# Patient Record
Sex: MALE | Race: OTHER | HISPANIC OR LATINO | Employment: STUDENT | URBAN - METROPOLITAN AREA
[De-identification: names, ages, dates, MRNs, and addresses within clinical notes are randomized per-mention and may not be internally consistent; named-entity substitution may affect disease eponyms.]

---
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
## 2024-03-19 ENCOUNTER — HOSPITAL ENCOUNTER (EMERGENCY)
Facility: HOSPITAL | Age: 24
Discharge: HOME | End: 2024-03-19
Attending: EMERGENCY MEDICINE
Payer: COMMERCIAL

## 2024-03-19 VITALS
SYSTOLIC BLOOD PRESSURE: 130 MMHG | DIASTOLIC BLOOD PRESSURE: 81 MMHG | TEMPERATURE: 98.7 F | OXYGEN SATURATION: 97 % | HEART RATE: 69 BPM | RESPIRATION RATE: 19 BRPM

## 2024-03-19 DIAGNOSIS — H91.92 DECREASED HEARING OF LEFT EAR: Primary | ICD-10-CM

## 2024-03-19 PROCEDURE — 99282 EMERGENCY DEPT VISIT SF MDM: CPT

## 2024-03-19 PROCEDURE — 99284 EMERGENCY DEPT VISIT MOD MDM: CPT

## 2024-03-19 RX ORDER — OFLOXACIN 3 MG/ML
5 SOLUTION AURICULAR (OTIC) 2 TIMES DAILY
Qty: 5 ML | Refills: 0 | Status: SHIPPED | OUTPATIENT
Start: 2024-03-19

## 2024-03-19 NOTE — DISCHARGE INSTRUCTIONS
Follow-up with ENT.  Take medicine as directed.  If any symptoms worsen or new symptoms appear return to the ER.

## 2024-03-19 NOTE — ED PROVIDER NOTES
"History  Chief Complaint   Patient presents with    Earache     Patient reports 3 weeks ago they went too far with a q tip, and experienced left ear pain. Seen at [facility] 3 weeks ago for same, provided ofloxacin drops for same, reports improvement to left ear pain however, patient reports tonight patient reports laying on left side, sensation of \"air going into my ear\" with pressure causing decreased hearing and pain to left ear.      The patient is a 24 y.o. male with a history of testicle surgery who presents to [facility] with a chief complaint of left ear hearing decrease. Symptoms began 3 weeks ago and have been intermittent since onset.  Patient reports using a Q-tip around 3 weeks ago pushing wax into his ear and had decreased hearing at that time.  Patient states that he was seen at [facility] and his ear was flushed of cerumen impaction. He reports he was given ofloxacin drops and his hearing improved. He states that tonight he noticed his decreased hearing returned. He currently denies fever or any pain. Associated symptoms include decreased hearing in the left ear. Symptoms are aggravated with none noted and alleviating factors include none noted. The patient denies fever, chills, night sweats, chest pain, shortness of breath, ear pain, ear discharge, headache, visual disturbance, falls, trauma, any q-tip use past initial incident. No other reported symptoms at this time.  Patient denies allergies to anything          History provided by:  Patient   used: No    Earache  Associated symptoms: hearing loss    Associated symptoms: no abdominal pain, no cough, no fever, no rash, no sore throat and no vomiting        Prior to Admission Medications   Prescriptions Last Dose Informant Patient Reported? Taking?   ondansetron (ZOFRAN-ODT) 4 mg disintegrating tablet   No No   Sig: Take 1 tablet by mouth every 8 (eight) hours as needed for nausea or vomiting   ondansetron " (ZOFRAN-ODT) 4 mg disintegrating tablet   No No   Sig: Take 1 tablet (4 mg total) by mouth every 8 (eight) hours as needed for nausea   oxymetazoline (AFRIN) 0.05 % nasal spray   No No   Si sprays by Each Nare route 2 (two) times a day   predniSONE 10 mg tablet   No No   Si mg for 2 days, 40 mg for 2 days, 30 mg for 2 days, 20 mg for 2 days, 10 mg for 2 days      Facility-Administered Medications: None       History reviewed. No pertinent past medical history.    Past Surgical History:   Procedure Laterality Date    TESTICLE SURGERY         History reviewed. No pertinent family history.  I have reviewed and agree with the history as documented.    E-Cigarette/Vaping    E-Cigarette Use Never User      E-Cigarette/Vaping Substances     Social History     Tobacco Use    Smoking status: Never    Smokeless tobacco: Never   Vaping Use    Vaping status: Never Used   Substance Use Topics    Alcohol use: No    Drug use: No       Review of Systems   Constitutional:  Negative for chills and fever.   HENT:  Positive for hearing loss. Negative for ear pain and sore throat.    Eyes:  Negative for pain and visual disturbance.   Respiratory:  Negative for cough and shortness of breath.    Cardiovascular:  Negative for chest pain and palpitations.   Gastrointestinal:  Negative for abdominal pain and vomiting.   Genitourinary:  Negative for dysuria and hematuria.   Musculoskeletal:  Negative for arthralgias and back pain.   Skin:  Negative for color change and rash.   Neurological:  Negative for seizures and syncope.   All other systems reviewed and are negative.      Physical Exam  Physical Exam  Vitals reviewed.   Constitutional:       General: He is not in acute distress.     Appearance: Normal appearance. He is not ill-appearing.   HENT:      Head: Normocephalic and atraumatic.      Right Ear: Tympanic membrane, ear canal and external ear normal. No decreased hearing noted. There is no impacted cerumen.      Left Ear:  Ear canal and external ear normal. Decreased hearing noted. No laceration, drainage, swelling or tenderness. There is no impacted cerumen. No foreign body. No mastoid tenderness. No PE tube. No hemotympanum. Tympanic membrane is not injected or scarred.      Nose: Nose normal.      Mouth/Throat:      Mouth: Mucous membranes are moist.      Pharynx: Oropharynx is clear. No oropharyngeal exudate.   Eyes:      General: No scleral icterus.     Extraocular Movements: Extraocular movements intact.      Conjunctiva/sclera: Conjunctivae normal.      Pupils: Pupils are equal, round, and reactive to light.   Cardiovascular:      Rate and Rhythm: Normal rate.      Pulses: Normal pulses.   Pulmonary:      Effort: Pulmonary effort is normal. No respiratory distress.      Breath sounds: No stridor. No wheezing or rhonchi.   Abdominal:      General: Abdomen is flat.      Tenderness: There is no abdominal tenderness.   Musculoskeletal:         General: No swelling. Normal range of motion.      Cervical back: Normal range of motion and neck supple. No rigidity.   Skin:     General: Skin is warm and dry.      Capillary Refill: Capillary refill takes less than 2 seconds.      Coloration: Skin is not jaundiced.   Neurological:      General: No focal deficit present.      Mental Status: He is alert and oriented to person, place, and time. Mental status is at baseline.      Cranial Nerves: No cranial nerve deficit.      Sensory: No sensory deficit.      Motor: No weakness.         Vital Signs  ED Triage Vitals [03/19/24 0249]   Temperature Pulse Respirations Blood Pressure SpO2   98.7 °F (37.1 °C) 69 19 130/81 97 %      Temp Source Heart Rate Source Patient Position - Orthostatic VS BP Location FiO2 (%)   Temporal Monitor Sitting Left arm --      Pain Score       --           Vitals:    03/19/24 0249   BP: 130/81   Pulse: 69   Patient Position - Orthostatic VS: Sitting         Visual Acuity      ED Medications  Medications - No data to  display    Diagnostic Studies  Results Reviewed       None                   No orders to display              Procedures  Procedures         ED Course                               SBIRT 22yo+      Flowsheet Row Most Recent Value   Initial Alcohol Screen: US AUDIT-C     1. How often do you have a drink containing alcohol? 0 Filed at: 03/19/2024 0409   2. How many drinks containing alcohol do you have on a typical day you are drinking?  0 Filed at: 03/19/2024 0409   Audit-C Score 0 Filed at: 03/19/2024 0409   SEGUNDO: How many times in the past year have you...    Used an illegal drug or used a prescription medication for non-medical reasons? Never Filed at: 03/19/2024 0409                      Medical Decision Making  The patient is a 24 y.o. male with a history of testicle surgery who presents to [facility] with a chief complaint of left ear hearing decrease.  Patient reports being seen within in the last three weeks for the same issue.He reports using ofloxacin drops with initial improvement, but now his hearing is decreased again. Patient can hear however states it is muffled. He reports that he has no pain, trauma, discharge, numbness, tingling, or balance issues. Recommended starting the ofloxacin drops again and seeing ENT today. Patient has drops left. He understands and agrees with plan. Prior to discharge, discharge instructions were discussed with patient at bedside. Patient was provided both verbal and written instructions. Patient is understanding of the discharge instructions and is agreeable to plan of care. Return precautions were discussed with patient bedside, patient verbalized understanding of signs and symptoms that would necessitate return to the ED. All questions were answered. Patient was comfortable with the plan of care and discharged to home.        Problems Addressed:  Decreased hearing of left ear: acute illness or injury    Risk  Prescription drug management.              Disposition  Final diagnoses:   Decreased hearing of left ear     Time reflects when diagnosis was documented in both MDM as applicable and the Disposition within this note       Time User Action Codes Description Comment    3/19/2024  3:55 AM CapriceJose bland Add [H91.92] Decreased hearing of left ear           ED Disposition       ED Disposition   Discharge    Condition   Stable    Date/Time   Tue Mar 19, 2024 8690    Comment   Rianmary Paige discharge to home/self care.                   Follow-up Information       Follow up With Specialties Details Why Contact Info Additional Information    [facility],  Family Medicine Schedule an appointment as soon as possible for a visit   [address] [facility] [place] [address]  [phone]       [facility] Emergency Medicine  If symptoms worsen [address] [facility] [id number] [phone] [facility], [address] [place], [place], [address]    [facility]ology Schedule an appointment as soon as possible for a visit today  [address] [facility] [address]  [phone] [facility] [address] [place] [address]            Discharge Medication List as of 3/19/2024  3:58 AM        START taking these medications    Details   ofloxacin (FLOXIN) 0.3 % otic solution Administer 5 drops into the left ear 2 (two) times a day, Starting Tue 3/19/2024, Normal           CONTINUE these medications which have NOT CHANGED    Details   !! ondansetron (ZOFRAN-ODT) 4 mg disintegrating tablet Take 1 tablet by mouth every 8 (eight) hours as needed for nausea or vomiting, Starting Fri 7/28/2017, Print      !! ondansetron (ZOFRAN-ODT) 4 mg disintegrating tablet Take 1 tablet (4 mg total) by mouth every 8 (eight) hours as needed for nausea, Starting Sun 11/18/2018, Print       oxymetazoline (AFRIN) 0.05 % nasal spray 2 sprays by Each Nare route 2 (two) times a day, Starting Mon 10/11/2021, Print      predniSONE 10 mg tablet 50 mg for 2 days, 40 mg for 2 days, 30 mg for 2 days, 20 mg for 2 days, 10 mg for 2 days, Normal       !! - Potential duplicate medications found. Please discuss with provider.              PDMP Review       None            ED Provider  Electronically Signed by             Jose Paige PA-C  03/19/24 9653

## 2024-05-27 VITALS
RESPIRATION RATE: 16 BRPM | BODY MASS INDEX: 29.73 KG/M2 | TEMPERATURE: 97.9 F | HEIGHT: 66 IN | DIASTOLIC BLOOD PRESSURE: 57 MMHG | HEART RATE: 65 BPM | SYSTOLIC BLOOD PRESSURE: 115 MMHG | OXYGEN SATURATION: 97 % | WEIGHT: 184.97 LBS

## 2024-05-27 PROCEDURE — 99282 EMERGENCY DEPT VISIT SF MDM: CPT

## 2024-05-28 ENCOUNTER — HOSPITAL ENCOUNTER (EMERGENCY)
Facility: HOSPITAL | Age: 24
Discharge: HOME | End: 2024-05-28
Attending: EMERGENCY MEDICINE | Admitting: EMERGENCY MEDICINE
Payer: COMMERCIAL

## 2024-05-28 DIAGNOSIS — H66.92 LEFT OTITIS MEDIA: ICD-10-CM

## 2024-05-28 DIAGNOSIS — H60.92 LEFT OTITIS EXTERNA: Primary | ICD-10-CM

## 2024-05-28 PROCEDURE — 99284 EMERGENCY DEPT VISIT MOD MDM: CPT | Performed by: EMERGENCY MEDICINE

## 2024-05-28 RX ORDER — NEOMYCIN SULFATE, POLYMYXIN B SULFATE AND HYDROCORTISONE 10; 3.5; 1 MG/ML; MG/ML; [USP'U]/ML
4 SUSPENSION/ DROPS AURICULAR (OTIC) EVERY 6 HOURS SCHEDULED
Qty: 10 ML | Refills: 0 | Status: SHIPPED | OUTPATIENT
Start: 2024-05-28

## 2024-05-28 RX ORDER — AMOXICILLIN 250 MG/1
1000 CAPSULE ORAL ONCE
Status: COMPLETED | OUTPATIENT
Start: 2024-05-28 | End: 2024-05-28

## 2024-05-28 RX ORDER — NEOMYCIN SULFATE, POLYMYXIN B SULFATE AND HYDROCORTISONE 10; 3.5; 1 MG/ML; MG/ML; [USP'U]/ML
4 SUSPENSION/ DROPS AURICULAR (OTIC) EVERY 6 HOURS SCHEDULED
Status: DISCONTINUED | OUTPATIENT
Start: 2024-05-28 | End: 2024-05-28 | Stop reason: HOSPADM

## 2024-05-28 RX ORDER — AMOXICILLIN 500 MG/1
1000 CAPSULE ORAL 2 TIMES DAILY
Qty: 28 CAPSULE | Refills: 0 | Status: SHIPPED | OUTPATIENT
Start: 2024-05-28 | End: 2024-06-04

## 2024-05-28 RX ADMIN — NEOMYCIN SULFATE, POLYMYXIN B SULFATE AND HYDROCORTISONE 4 DROP: 10; 3.5; 1 SUSPENSION/ DROPS AURICULAR (OTIC) at 00:56

## 2024-05-28 RX ADMIN — AMOXICILLIN 1000 MG: 250 CAPSULE ORAL at 00:55

## 2024-05-28 NOTE — ED PROVIDER NOTES
History  Chief Complaint   Patient presents with    Earache     L sided ear pain since last night, no sick contacts that pt is aware of     24-year-old male presents with left-sided ear pain since last night.  States that it feels like a stinging.  Associated local lymphadenopathy.  No ear drainage.  No sick contacts.      Earache  Associated symptoms: no ear discharge and no fever        Prior to Admission Medications   Prescriptions Last Dose Informant Patient Reported? Taking?   Loratadine 10 MG CAPS   Yes No   Sig: Take 10 mg by mouth   amoxicillin-clavulanate (AUGMENTIN) 875-125 mg per tablet   Yes No   ofloxacin (FLOXIN) 0.3 % otic solution   No No   Sig: Administer 5 drops into the left ear 2 (two) times a day   ondansetron (ZOFRAN-ODT) 4 mg disintegrating tablet   No No   Sig: Take 1 tablet by mouth every 8 (eight) hours as needed for nausea or vomiting   ondansetron (ZOFRAN-ODT) 4 mg disintegrating tablet   No No   Sig: Take 1 tablet (4 mg total) by mouth every 8 (eight) hours as needed for nausea   oxymetazoline (AFRIN) 0.05 % nasal spray   No No   Si sprays by Each Nare route 2 (two) times a day   predniSONE 10 mg tablet   No No   Si mg for 2 days, 40 mg for 2 days, 30 mg for 2 days, 20 mg for 2 days, 10 mg for 2 days   prednisoLONE acetate (PRED FORTE) 1 % ophthalmic suspension   No No   Sig: Apply 2 drops 2 left ear daily      Facility-Administered Medications: None       No past medical history on file.    Past Surgical History:   Procedure Laterality Date    TESTICLE SURGERY         No family history on file.  I have reviewed and agree with the history as documented.    E-Cigarette/Vaping    E-Cigarette Use Never User      E-Cigarette/Vaping Substances     Social History     Tobacco Use    Smoking status: Never    Smokeless tobacco: Never   Vaping Use    Vaping status: Never Used   Substance Use Topics    Alcohol use: No    Drug use: No       Review of Systems   Constitutional:  Negative for  fever.   HENT:  Positive for ear pain. Negative for ear discharge.    All other systems reviewed and are negative.      Physical Exam  Physical Exam  Vitals reviewed.   Constitutional:       General: He is not in acute distress.     Appearance: He is well-developed. He is not diaphoretic.   HENT:      Head: Normocephalic and atraumatic.      Right Ear: Tympanic membrane normal.      Ears:      Comments: Erythema to the canal.  Opacification with bulging to the eardrum.  Associated local left-sided lymphadenopathy  Eyes:      Conjunctiva/sclera: Conjunctivae normal.   Pulmonary:      Effort: Pulmonary effort is normal. No respiratory distress.      Breath sounds: Normal breath sounds.   Musculoskeletal:         General: Normal range of motion.      Cervical back: Normal range of motion and neck supple.   Skin:     General: Skin is warm and dry.      Coloration: Skin is not pale.   Neurological:      Mental Status: He is alert and oriented to person, place, and time.      Cranial Nerves: No cranial nerve deficit.   Psychiatric:         Behavior: Behavior normal.         Vital Signs  ED Triage Vitals [05/27/24 2345]   Temperature Pulse Respirations Blood Pressure SpO2   97.9 °F (36.6 °C) 65 16 115/57 97 %      Temp Source Heart Rate Source Patient Position - Orthostatic VS BP Location FiO2 (%)   Oral Monitor Sitting Left arm --      Pain Score       7           Vitals:    05/27/24 2345   BP: 115/57   Pulse: 65   Patient Position - Orthostatic VS: Sitting         Visual Acuity      ED Medications  Medications   neomycin-polymyxin-hydrocortisone (CORTISPORIN) 0.35%-10,000 units/mL-1% otic suspension 4 drop (has no administration in time range)   amoxicillin (AMOXIL) capsule 1,000 mg (has no administration in time range)       Diagnostic Studies  Results Reviewed       None                   No orders to display              Procedures  Procedures         ED Course                               SBIRT 22yo+      Flowsheet  Row Most Recent Value   Initial Alcohol Screen: US AUDIT-C     1. How often do you have a drink containing alcohol? 0 Filed at: 05/27/2024 2346   2. How many drinks containing alcohol do you have on a typical day you are drinking?  0 Filed at: 05/27/2024 2346   3a. Male UNDER 65: How often do you have five or more drinks on one occasion? 0 Filed at: 05/27/2024 2346   Audit-C Score 0 Filed at: 05/27/2024 2346   SEGUNDO: How many times in the past year have you...    Used an illegal drug or used a prescription medication for non-medical reasons? Never Filed at: 05/27/2024 2346                      Medical Decision Making  Otitis media with element of otitis externa.  Will treat with amoxicillin and polymyxin drops.  Advise follow-up with ENT.    Risk  Prescription drug management.             Disposition  Final diagnoses:   Left otitis externa   Left otitis media     Time reflects when diagnosis was documented in both MDM as applicable and the Disposition within this note       Time User Action Codes Description Comment    5/28/2024 12:43 AM Sarah Segura Add [H60.92] Left otitis externa     5/28/2024 12:43 AM Sarah Segura Add [H66.92] Left otitis media           ED Disposition       ED Disposition   Discharge    Condition   Stable    Date/Time   Tue May 28, 2024 0043    Comment   Rian Paige discharge to home/self care.                   Follow-up Information       Follow up With Specialties Details Why Contact Info Additional Information    [facility] Schedule an appointment as soon as possible for a visit  For follow up to ensure improvement, and for further testing and treatment as needed [address]  [facility] [id number] [phone] [facility] [address] [place] [address]    [facility] Emergency Medicine  If symptoms worsen [address]  [facility] [address]  [phone] [facility], [address] [place], [place], [address]            Patient's Medications   Discharge Prescriptions    AMOXICILLIN (AMOXIL) 500 MG CAPSULE    Take 2 capsules (1,000 mg total) by mouth 2 (two) times a day for 7 days       Start Date: 5/28/2024 End Date: 6/4/2024       Order Dose: 1,000 mg       Quantity: 28 capsule    Refills: 0    NEOMYCIN-POLYMYXIN-HYDROCORTISONE (CORTISPORIN) 0.35%-10,000 UNITS/ML-1% OTIC SUSPENSION    Administer 4 drops into the left ear every 6 (six) hours       Start Date: 5/28/2024 End Date: --       Order Dose: 4 drops       Quantity: 10 mL    Refills: 0       No discharge procedures on file.    PDMP Review       None            ED Provider  Electronically Signed by             Sarah Segura DO  05/28/24 0048